# Patient Record
Sex: FEMALE | Race: WHITE | URBAN - METROPOLITAN AREA
[De-identification: names, ages, dates, MRNs, and addresses within clinical notes are randomized per-mention and may not be internally consistent; named-entity substitution may affect disease eponyms.]

---
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
## 2020-04-20 ENCOUNTER — VIRTUAL VISIT (OUTPATIENT)
Dept: FAMILY MEDICINE CLINIC | Age: 20
End: 2020-04-20
Payer: COMMERCIAL

## 2020-04-20 PROCEDURE — 99213 OFFICE O/P EST LOW 20 MIN: CPT | Performed by: FAMILY MEDICINE

## 2020-04-20 NOTE — PROGRESS NOTES
breathing or respiratory distress        [] Abnormal-      Musculoskeletal:   [] Normal gait with no signs of ataxia         [] Normal range of motion of neck        [] Abnormal-       Neurological:        [] No Facial Asymmetry (Cranial nerve 7 motor function) (limited exam to video visit)          [] No gaze palsy        [] Abnormal-         Skin:        [] No significant exanthematous lesions or discoloration noted on facial skin         [] Abnormal-            Psychiatric:       [] Normal Affect [] No Hallucinations        [] Abnormal-     Other pertinent observable physical exam findings-     ASSESSMENT/PLAN:  1. Right lower quadrant abdominal mass  differential diagnosis reviewed; LAD vs sebaceous cyst (more likely) vs hernia (less likely)  If gets worse/more painful will come into the office for exam.  Continue to monitor for now      No follow-ups on file. Jose J Raymundo is a 20 y.o. female being evaluated by a Virtual Visit (video visit) encounter to address concerns as mentioned above. A caregiver was present when appropriate. Due to this being a TeleHealth encounter (During [facility] [place] public health emergency), evaluation of the following organ systems was limited: Vitals/Constitutional/EENT/Resp/CV/GI//MS/Neuro/Skin/Heme-Lymph-Imm. Pursuant to the emergency declaration under the [address] [place] [address] authority and the [employer] and [employer] Act, this Virtual Visit was conducted with patient's (and/or legal guardian's) consent, to reduce the patient's risk of exposure to COVID-19 and provide necessary medical care. The patient (and/or legal guardian) has also been advised to contact this office for worsening conditions or problems, and seek emergency medical treatment and/or call 911 if deemed necessary.      Services were provided through a video synchronous discussion virtually to substitute for in-person clinic

## 2023-12-08 ENCOUNTER — OFFICE VISIT (OUTPATIENT)
Dept: ORTHOPEDIC SURGERY | Age: 23
End: 2023-12-08
Payer: COMMERCIAL

## 2023-12-08 VITALS — BODY MASS INDEX: 19.83 KG/M2 | HEIGHT: 65 IN | RESPIRATION RATE: 12 BRPM | WEIGHT: 119 LBS

## 2023-12-08 DIAGNOSIS — M25.512 ACUTE PAIN OF LEFT SHOULDER: Primary | ICD-10-CM

## 2023-12-08 DIAGNOSIS — G25.89 SCAPULAR DYSKINESIS: ICD-10-CM

## 2023-12-08 DIAGNOSIS — M75.82 ROTATOR CUFF TENDINITIS, LEFT: ICD-10-CM

## 2023-12-08 PROCEDURE — 99204 OFFICE O/P NEW MOD 45 MIN: CPT | Performed by: ORTHOPAEDIC SURGERY

## 2023-12-08 RX ORDER — MELOXICAM 7.5 MG/1
7.5 TABLET ORAL DAILY
Qty: 30 TABLET | Refills: 3 | Status: SHIPPED | OUTPATIENT
Start: 2023-12-08

## 2023-12-08 NOTE — PROGRESS NOTES
There is no pain over the acromioclavicular or sternoclavicular joints. The patient has no biceps pain. There is full range of motion. There is no pain with impingement testing. There is no pain with Em maneuver. [place]ver[place]'s maneuver is normal.  There is no pain or apprehension in the abducted externally rotated position. There is no sulcus sign. There is no instability with anterior or posterior stress applied. The patient demonstrates full strength in the supraspinatus, infraspinatus, and subscapularis. Neurologic and vascular examination of the upper extremity  is normal.    Left shoulder has no AC or SC joint pain. She has mild discomfort with external rotation and internal rotation with tightness in internal rotation of about 30 degrees. She has mild pain with Em maneuver but no pain over the long head bicep and no instability. She has significant scapular dyskinesis on the left side and mild to moderate on the right. Bilateral AC joint x-rays from urgent care with and without weights are reviewed and her shoulders are normal.    I interpreted these x-rays without any formal interpretation from the urgent care. Assessment: Left shoulder stiffness and rotator cuff tendinitis with scapular dyskinesis. Plan: We will start anti-inflammatory. Prescription for meloxicam 7.5 mg a day was provided. I recommended she start therapy. She says she needs to do that after the first of the year when her mother's insurance changes. She will follow-up with me in 2 months.